# Patient Record
(demographics unavailable — no encounter records)

---

## 2024-10-11 NOTE — HISTORY OF PRESENT ILLNESS
[FreeTextEntry1] : HISTORY OF PRESENT ILLNESS ***Oct. 11, 2024*** Ped Endocrinologist: Dr. Lozano Ms. 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   She denies history of HTN, dyslipidemia, CAD, known complications of retinopathy, nephropathy, or neuropathy. She reports paternal history of Diabetes. She reports she was on insulin when she was 5 years old but stopped because it didn't help, may have tried it again years later for a short time but again it didn't help.  She is presently not on any medications.  She reports previously being very physically active in school structured clubs.  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   Blood sugars are not checked   Hypoglycemia frequency: Pt denies   Fingerstick glucose in the office today is  173 mg/dL  less than 1hour after eating.   Diet: not following ADA, reports difficulty maintaining a deliberate diet on school days commuting to [place] for class   Exercise: Denies structured exercise regimen, college student   Lab review: a1c- usually maintaining the low 6% range, Pt feels it may be higher now   Last dilated eye -  Last podiatry visit  -  Last cardiology evaluation -  Last stress test -  Last 2-D Echo -  Last nephrology evaluation -  Last neurology evaluation-

## 2024-10-11 NOTE — ASSESSMENT
[FreeTextEntry1] : T2DM  Need more data at present -refuses cgm at present but will accept if A1c is higher than it's been -Blood work today -We discussed t2dm medications in general risks and benefits for starting treatment -We discussed bg goals  rtc 3 months    Diabetes Counseling: The patient was counseled on diabetes foot care, long term vascular complications of diabetes, carbohydrate consistent diet, importance of diet and exercise to improve glycemic control, achieve weight loss and improve cardiovascular health, exercise/effect on glucose, hypoglycemia management, glucagon use, ketone testing, action and use of short and long-acting insulin, self-monitoring of blood glucose, insulin self-administration, injection technique, storage, and sharps disposal and sick-day management.  Patient was referred to ophthalmology for retinopathy screening. Risks and benefits of diabetic medications were discussed.

## 2025-04-14 NOTE — HISTORY OF PRESENT ILLNESS
[FreeTextEntry1] : ***Apr. 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 BG is 173mg/dL less than an hour after eating.    HISTORY OF PRESENT ILLNESS ***Oct. 11, 2024*** Ped Endocrinologist: Dr. Lozano Ms. 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   She denies history of HTN, dyslipidemia, CAD, known complications of retinopathy, nephropathy, or neuropathy. She reports paternal history of Diabetes. She reports she was on insulin when she was 5 years old but stopped because it didn't help, may have tried it again years later for a short time but again it didn't help.  She is presently not on any medications.  She reports previously being very physically active in school structured clubs.  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   Blood sugars are not checked   Hypoglycemia frequency: Pt denies   Fingerstick glucose in the office today is  173 mg/dL  less than 1hour after eating.   Diet: not following ADA, reports difficulty maintaining a deliberate diet on school days commuting to [place] for class   Exercise: Denies structured exercise regimen, college student   Lab review: a1c- usually maintaining the low 6% range, Pt feels it may be higher now   Last dilated eye -  Last podiatry visit  -  Last cardiology evaluation -  Last stress test -  Last 2-D Echo -  Last nephrology evaluation -  Last neurology evaluation-

## 2025-04-14 NOTE — HISTORY OF PRESENT ILLNESS
[FreeTextEntry1] : ***Apr. 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 BG is 173mg/dL less than an hour after eating.    HISTORY OF PRESENT ILLNESS ***Oct. 11, 2024*** Ped Endocrinologist: Dr. Lozano Ms. 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   She denies history of HTN, dyslipidemia, CAD, known complications of retinopathy, nephropathy, or neuropathy. She reports paternal history of Diabetes. She reports she was on insulin when she was 5 years old but stopped because it didn't help, may have tried it again years later for a short time but again it didn't help.  She is presently not on any medications.  She reports previously being very physically active in school structured clubs.  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   Blood sugars are not checked   Hypoglycemia frequency: Pt denies   Fingerstick glucose in the office today is  173 mg/dL  less than 1hour after eating.   Diet: not following ADA, reports difficulty maintaining a deliberate diet on school days commuting to [place] for class   Exercise: Denies structured exercise regimen, college student   Lab review: a1c- usually maintaining the low 6% range, Pt feels it may be higher now   Last dilated eye -  Last podiatry visit  -  Last cardiology evaluation -  Last stress test -  Last 2-D Echo -  Last nephrology evaluation -  Last neurology evaluation-

## 2025-04-14 NOTE — ASSESSMENT
[FreeTextEntry1] : T2DM  -again refuses cgm at present but will accept if A1c is higher than it's been -Blood work today -We discussed t2dm medications in general risks and benefits for starting treatment -We discussed bg goals -We discussed the dangers of vaping rtc 3 months    Diabetes Counseling: The patient was counseled on diabetes foot care, long term vascular complications of diabetes, carbohydrate consistent diet, importance of diet and exercise to improve glycemic control, achieve weight loss and improve cardiovascular health, exercise/effect on glucose, hypoglycemia management, glucagon use, ketone testing, action and use of short and long-acting insulin, self-monitoring of blood glucose, insulin self-administration, injection technique, storage, and sharps disposal and sick-day management.  Patient was referred to ophthalmology for retinopathy screening. Risks and benefits of diabetic medications were discussed.